# Patient Record
Sex: MALE | Race: BLACK OR AFRICAN AMERICAN | URBAN - METROPOLITAN AREA
[De-identification: names, ages, dates, MRNs, and addresses within clinical notes are randomized per-mention and may not be internally consistent; named-entity substitution may affect disease eponyms.]

---

## 2017-11-17 ENCOUNTER — TELEPHONE (OUTPATIENT)
Dept: INTERNAL MEDICINE | Age: 27
End: 2017-11-17

## 2017-11-20 ENCOUNTER — OFFICE VISIT (OUTPATIENT)
Dept: INTERNAL MEDICINE | Age: 27
End: 2017-11-20
Attending: STUDENT IN AN ORGANIZED HEALTH CARE EDUCATION/TRAINING PROGRAM

## 2017-11-20 VITALS
RESPIRATION RATE: 16 BRPM | TEMPERATURE: 98.4 F | HEART RATE: 112 BPM | OXYGEN SATURATION: 96 % | DIASTOLIC BLOOD PRESSURE: 84 MMHG | SYSTOLIC BLOOD PRESSURE: 164 MMHG

## 2017-11-20 DIAGNOSIS — R21 RASH: Primary | ICD-10-CM

## 2017-11-20 RX ORDER — TRIAMCINOLONE ACETONIDE 1 MG/G
CREAM TOPICAL
Qty: 1 TUBE | Refills: 0 | Status: SHIPPED | OUTPATIENT
Start: 2017-11-20

## 2017-11-20 RX ORDER — ACYCLOVIR 800 MG/1
400 TABLET ORAL
Qty: 18 TABLET | Refills: 0 | Status: SHIPPED | OUTPATIENT
Start: 2017-11-20 | End: 2017-11-20 | Stop reason: SDUPTHER

## 2017-11-20 RX ORDER — HYDROXYZINE HYDROCHLORIDE 25 MG/1
25 TABLET, FILM COATED ORAL EVERY 6 HOURS PRN
Qty: 20 TABLET | Refills: 0 | Status: SHIPPED | OUTPATIENT
Start: 2017-11-20 | End: 2017-11-30

## 2017-11-20 RX ORDER — ACYCLOVIR 400 MG/1
400 TABLET ORAL
Qty: 35 TABLET | Refills: 0 | Status: SHIPPED | OUTPATIENT
Start: 2017-11-20 | End: 2017-11-27 | Stop reason: ALTCHOICE

## 2017-11-20 ASSESSMENT — ENCOUNTER SYMPTOMS
NAUSEA: 0
VOMITING: 0
SORE THROAT: 0
ABDOMINAL PAIN: 0
COUGH: 0
SHORTNESS OF BREATH: 0

## 2017-11-20 NOTE — PATIENT INSTRUCTIONS
Return in 1 week. Refill given on hydroxyzine for itching. Use fragrance free soaps to bath. Acyclovir : take  One  tablet five times a day. Triamcinolone  Cream to rash twice a day. Patient Education   Patient Education          triamcinolone topical  Pronunciation:  trye am SIN oh lone  Brand:  Cinolar, Kenalog, Oralone, Pediaderm TA, Triamcinolone Acetonide in Absorbase, Trianex, Triderm  What is the most important information I should know about triamcinolone topical?  Use this medication exactly as directed on the label, or as it has been prescribed by your doctor. Do not use the medication in larger amounts or for longer than recommended. Do not cover treated skin areas with a bandage or other covering unless your doctor has told you to. If you are treating the diaper area of a baby, do not use plastic pants or tight-fitting diapers. Covering the skin that is treated with triamcinolone topical can increase the amount of the drug your skin absorbs, which may lead to unwanted side effects. Follow your doctor's instructions. Avoid using this medication on your face, near your eyes, or on body areas where you have skin folds or thin skin. Do not use this medication on a child without a doctor's advice. Children are more sensitive to the effects of triamcinolone topical.  Triamcinolone topical will not treat a bacterial, fungal, or viral skin infection. Contact your doctor if your condition does not improve or if it gets worse after using this medication for several days. What is triamcinolone topical?  Triamcinolone is a topical steroid. It reduces the actions of chemicals in the body that cause inflammation. Triamcinolone topical is used to treat the inflammation caused by a number of conditions such as allergic reactions, eczema, and psoriasis. The dental paste form of triamcinolone is used to treat mouth ulcers.   Triamcinolone topical may also be used for purposes not listed in this medication guide.  What should I discuss with my healthcare provider before using triamcinolone topical?  Do not use this medication if you are allergic to triamcinolone. To make sure you can safely use triamcinolone topical, tell your doctor if you have any of these other conditions:  · any skin infection, especially tuberculosis infection of the skin;  · chicken pox or herpes infection (including cold sores);  · diabetes; or  · a stomach ulcer. FDA pregnancy category C. It is not known whether triamcinolone topical will harm an unborn baby. Tell your doctor if you are pregnant or plan to become pregnant while using this medication. It is not known whether triamcinolone topical passes into breast milk or if it could harm a nursing baby. Do not use this medication without telling your doctor if you are breast-feeding a baby. Do not use this medication on a child without a doctor's advice. Children are more sensitive to the effects of triamcinolone topical.  How should I use triamcinolone topical?  Use exactly as prescribed by your doctor. Do not use in larger or smaller amounts or for longer than recommended. Follow the directions on your prescription label. Triamcinolone topical will not treat a bacterial, fungal, or viral skin infection. Wash your hands before and after each application, unless you are using triamcinolone topical to treat a hand condition. Apply a small amount to the affected area and rub it gently into the skin. Avoid using this medication on your face, near your eyes or mouth, or on body areas where you have skin folds or thin skin. If you are using the dental paste, apply the medication in a thin layer, just enough to cover the mouth ulcer. The paste may stick better if you dry the mouth ulcer before applying the medication. Do not cover treated skin areas with a bandage or other covering unless your doctor has told you to.  If you are treating the diaper area of a baby, do not use plastic pants gums:  · blurred vision, or seeing halos around lights;  · uneven heartbeats;  · mood changes;  · sleep problems (insomnia);  · weight gain, puffiness in your face; or  · feeling tired. Less serious side effects may include:  · skin redness, burning, itching, or peeling;  · thinning of your skin; or  · blistering skin; or  · stretch marks. This is not a complete list of side effects and others may occur. Call your doctor for medical advice about side effects. You may report side effects to FDA at 7-974-FDA-7132. What other drugs will affect triamcinolone topical?  It is not likely that other drugs you take orally or inject will have an effect on topically applied triamcinolone topical. But many drugs can interact with each other. Tell your doctor about all medications you use. This includes prescription, over-the-counter, vitamin, and herbal products. Do not start a new medication without telling your doctor. Where can I get more information? Your pharmacist can provide more information about triamcinolone topical.    Remember, keep this and all other medicines out of the reach of children, never share your medicines with others, and use this medication only for the indication prescribed. Every effort has been made to ensure that the information provided by [address] is accurate, up-to-date, and complete, but no guarantee is made to that effect. Drug information contained herein may be time sensitive. [facility] information has been compiled for use by healthcare practitioners and consumers in the [place] and therefore [facility] does not warrant that uses outside of the [place] are appropriate, unless specifically indicated otherwise. [facility]'s drug information does not endorse drugs, diagnose patients or recommend therapy.  [facility] information is an informational resource designed to assist licensed healthcare practitioners in caring for their patients and/or to serve consumers contact with other people. Avoid touching an infected area and then touching your eyes. Wash your hands frequently to prevent passing the infection to others. Taking this medicine will not prevent you from passing genital herpes to your sexual partner. Avoid sexual intercourse while you have active lesions or the first symptoms of an outbreak. Genital herpes may still be contagious through \"viral shedding\" from your skin, even if you have no symptoms. What are the possible side effects of acyclovir? Get emergency medical help if you have any of these signs of an allergic reaction: hives; difficult breathing; swelling of your face, lips, tongue, or throat. Call your doctor at once if you have:  · easy bruising or bleeding, purple or red pinpoint spots under your skin; or  · signs of a kidney problem --little or no urinating; painful or difficult urination; swelling in your feet or ankles; feeling tired or short of breath. Common side effects may include:  · nausea, vomiting;  · diarrhea;  · general ill feeling;  · headache; or  · mouth pain while using an acyclovir buccal tablet. This is not a complete list of side effects and others may occur. Call your doctor for medical advice about side effects. You may report side effects to FDA at 9-959-FDA-2411. What other drugs will affect acyclovir? Acyclovir can harm your kidneys. This effect is increased when you also use certain other medicines, including: antivirals, chemotherapy, injected antibiotics, medicine for bowel disorders, medicine to prevent organ transplant rejection, injectable osteoporosis medication, and some pain or arthritis medicines (including aspirin, Tylenol, Advil, and Aleve). Other drugs may interact with acyclovir, including prescription and over-the-counter medicines, vitamins, and herbal products. Tell each of your health care providers about all medicines you use now and any medicine you start or stop using.   Where can I get more

## 2017-11-20 NOTE — PROGRESS NOTES
[facility]       NURSING PROGRESS NOTE      November 20, 2017  Maeve Hernandez    Chief Complaint:   Chief Complaint   Patient presents with   Raman Guerrero Follow-up     ER for rash       Constitutional: negative  Eyes: negative  Ears, nose, mouth, throat, and face: negative  Respiratory: negative  Cardiovascular: negative  Gastrointestinal: negative  Genitourinary: negative  Integument/breast: negative, rash better with the prednisone, but he is still itching. Hematologic/lymphatic: negative  Musculoskeletal: negative  Neurological: negative  Diabetes: No  Yes:  Medication compliance:    Diabetic diet compliance:    Home glucose monitoring:    Last eye exam:     Acute symptoms hyperglycemia:    Acute symptoms hypoglycemia:    POC glucose today:  mg/dL    Pain Assessment:  Pain Present: no  Pain Score: 0  Pain Quality/Description:     Mobility/Safety/Self-Care:  Steady Gait: Yes  History of Falls: No   History of a Fall within the last 30 days No  Assistive Device: None  Poor Hygiene: No    Psychosocial:   Depression: No  If YES,    Does Patient express current thoughts of harming self or others?: No  Anxious: No  Insomnia: No  Inappropriate Behavior: No  Alcohol Abuse: no  Substance Abuse: no  Signs of Physical Abuse: No  Signs of Emotional Abuse: No    Educational:  Identify the learner who is being assessed for education:  patient                    Ability to Learn:  Exhibits ability to grasp concepts and respond to questions: Medium  Ready to Learn: Yes  calm   Preferred Method of Learning:  written  Barriers to Learning: Verbalizes interest  Special Considerations due to cultural, Confucianist, spiritual beliefs:  No  Language:  English  :  No    Note:   Rash better, but itching still there. States his hands , face and feet swell off and on . No complaints of pain .        1:49 PM 11/20/2017

## 2017-11-27 ENCOUNTER — OFFICE VISIT (OUTPATIENT)
Dept: INTERNAL MEDICINE | Age: 27
End: 2017-11-27
Attending: STUDENT IN AN ORGANIZED HEALTH CARE EDUCATION/TRAINING PROGRAM

## 2017-11-27 VITALS
TEMPERATURE: 98.3 F | HEART RATE: 92 BPM | BODY MASS INDEX: 34.7 KG/M2 | OXYGEN SATURATION: 100 % | DIASTOLIC BLOOD PRESSURE: 91 MMHG | WEIGHT: 292.6 LBS | RESPIRATION RATE: 20 BRPM | SYSTOLIC BLOOD PRESSURE: 133 MMHG

## 2017-11-27 DIAGNOSIS — R60.9 SWELLING: Primary | ICD-10-CM

## 2017-11-27 LAB
GLUCOSE BLD-MCNC: 92 MG/DL (ref 70–99)
PERFORMED ON: NORMAL

## 2017-11-27 ASSESSMENT — ENCOUNTER SYMPTOMS
COUGH: 0
VOMITING: 0
ORTHOPNEA: 0
NAUSEA: 0
ABDOMINAL PAIN: 0

## 2017-11-27 NOTE — PATIENT INSTRUCTIONS
Before any of you medication is completely gone, call your pharmacy AT LEAST 1 WEEK ahead for refills. Review all information regarding your medication before starting.     If you become ill when the clinic is closed, please call the [facility] [employer]  at   #[phone] and ask the  to page the AOD between 6:00 AM and 6:00 PM or page the AON between 6:00 PM and 6:00 am    Establish Primary Care doctor            Continue medication as listed on discharge sheet    Instructions reviewed before discharge and copy given to patient    [address] [phone]

## 2017-11-27 NOTE — PROGRESS NOTES
Department Of Internal Medicine  General Medicine/Primary Care  Established Patient Visit    Patient:  Ilana Wong                                               : 1990  Age: 28 y.o. MRN: [id number]  Date : 2017    History Obtained From:  patient    REASON FOR VISIT: follow up for rash     HISTORY OF PRESENT ILLNESS:   The patient is a 28 y.o. male with h/o schizophrenia (managed by [facility]) who was seen in our clinic last week for a pruritic urticarial rash after an ED visit. Patient was initially on prednisone 60 mg daily for 5 days and hydroxyzine 25 Q6 hours for itching. When we saw him last week, we diagnosed him with pityriasis rosea and treated him with acyclovir and triamcinolone cream.     Today, he reports that his rash has completely resolved, he has no pruritis. He is endorsing swelling in his arms and feet for the past 2 weeks. He states that he is urinating more often, denies polydipsia and dysuria. He wants to be tested for diabetes because he states that his grandmother had the swelling in her extremities as well before being diagnosed with diabetes. He has no other complaints today. He has come to the clinic with the  today. Past Medical History:        Diagnosis Date    Schizo affective schizophrenia [facility])        Past Surgical History:        Procedure Laterality Date    EYE SURGERY         Family History:   No family history on file. Social History:   TOBACCO:   reports that he has been smoking Cigarettes. He has been smoking about 0.10 packs per day. He has never used smokeless tobacco.  ETOH:   reports that he drinks alcohol. OCCUPATION:      Allergies:  Review of patient's allergies indicates no known allergies. Current Medications:    Prior to Admission medications    Medication Sig Start Date End Date Taking? Authorizing Provider   triamcinolone (KENALOG) 0.1 % cream Apply topically 2 times daily for 1 week.  17  Yes Contreras Meek Karey Perez MD   traZODone (DESYREL) 100 MG tablet Take 100 mg by mouth nightly   Yes Historical Provider, MD   hydrOXYzine (ATARAX) 25 MG tablet Take 1 tablet by mouth every 6 hours as needed for Itching 11/20/17 11/30/17  Montserrat Taylor MD       Review of Systems   Constitutional: Negative for chills and fever. HENT: Negative for congestion. Respiratory: Negative for cough. Cardiovascular: Negative for chest pain and orthopnea. Gastrointestinal: Negative for abdominal pain, nausea and vomiting. Genitourinary: Positive for frequency. Negative for dysuria and urgency. Skin: Negative for itching and rash. Endo/Heme/Allergies: Negative for polydipsia. Physical Exam:      Vitals: BP (!) 133/91   Pulse 92   Temp 98.3 °F (36.8 °C)   Resp 20   Wt 292 lb 9.6 oz (132.7 kg)   SpO2 100%   BMI 34.70 kg/m²     Body mass index is 34.7 kg/m². Wt Readings from Last 3 Encounters:   11/27/17 292 lb 9.6 oz (132.7 kg)   11/16/17 275 lb (124.7 kg)   04/24/17 240 lb (108.9 kg)       · Gen - Alert, no signs of distress, appears stated age and cooperative  · HEENT - NC/AT  · Eye - Normal external eye, conjunctiva, lids cornea, PERLLA, no nystagmus  · Ears - Normal TM's bilaterally. Normal auditory canals and external ears. Non-tender  · Nose - Normal external nose, mucus membranes and septum  · Pharynx - Dental Hygiene adequate. Normal buccal mucosa. Normal pharynx  · Neck / Thyroid - Supple, no masses, nodes, nodules or enlargement. No adenopathy, no carotid bruit, no JVD, supple, symmetrical, trachea midline and thyroid not enlarged, symmetric, no tenderness/mass/nodules  · CVS - Regular rate. Regular rhythm. No mumur or rub. No edema  · Resp - No accessory muscle use. No crackles. No wheezing. No rhonchi  · GI - Non-tender. Non-distended. No masses. No organmegaly. Normal bowel sounds  · Skin - Warm and dry. No nodule on exposed extremities.  No rash on exposed extremities; trace pitting edema in the feet   · Lymph discussed with preceptor, Dr. Georges Partida. Anthony Worthington MD  Internal Medicine Resident  Pager: [phone]  11/27/2017, 1:53 PM     Addendum to Resident H& P/Progress note:  I have personally seen,examined and evaluated the patient.  I have reviewed the current history, physical findings, labs and assessment and plan and agree with note as documented by resident MD ( Mookie Cruz)      Brennen Hammond MD, Silverio Arita